# Patient Record
Sex: MALE | Race: WHITE | NOT HISPANIC OR LATINO | Employment: FULL TIME | URBAN - METROPOLITAN AREA
[De-identification: names, ages, dates, MRNs, and addresses within clinical notes are randomized per-mention and may not be internally consistent; named-entity substitution may affect disease eponyms.]

---

## 2018-08-22 LAB
CHOLEST SERPL-MSCNC: 203 MG/DL (ref 0–200)
HDLC SERPL-MCNC: 35 MG/DL
LDL/HDL RATIO: 5.8 (ref 0–5)
LDLC SERPL CALC-MCNC: 148 MG/DL (ref 0–100)
TRIGL SERPL-MCNC: 95 MG/DL (ref 0–150)

## 2019-03-08 RX ORDER — FLUTICASONE PROPIONATE AND SALMETEROL 50; 500 UG/1; UG/1
POWDER RESPIRATORY (INHALATION)
Refills: 0 | OUTPATIENT
Start: 2019-03-08

## 2019-03-26 ENCOUNTER — TELEPHONE (OUTPATIENT)
Dept: PRIMARY CARE CLINIC | Facility: CLINIC | Age: 34
End: 2019-03-26

## 2019-03-26 RX ORDER — FLUTICASONE PROPIONATE AND SALMETEROL 500; 50 UG/1; UG/1
1 POWDER RESPIRATORY (INHALATION) 2 TIMES DAILY
Qty: 60 EACH | Refills: 5 | Status: SHIPPED | OUTPATIENT
Start: 2019-03-26 | End: 2020-03-25

## 2019-03-29 ENCOUNTER — TELEPHONE (OUTPATIENT)
Dept: PRIMARY CARE CLINIC | Facility: CLINIC | Age: 34
End: 2019-03-29

## 2019-06-28 ENCOUNTER — OFFICE VISIT (OUTPATIENT)
Dept: PRIMARY CARE CLINIC | Facility: CLINIC | Age: 34
End: 2019-06-28
Payer: COMMERCIAL

## 2019-06-28 VITALS
HEIGHT: 74 IN | BODY MASS INDEX: 29.65 KG/M2 | SYSTOLIC BLOOD PRESSURE: 122 MMHG | DIASTOLIC BLOOD PRESSURE: 82 MMHG | WEIGHT: 231 LBS | HEART RATE: 80 BPM

## 2019-06-28 DIAGNOSIS — J30.9 ALLERGIC RHINITIS, UNSPECIFIED SEASONALITY, UNSPECIFIED TRIGGER: ICD-10-CM

## 2019-06-28 DIAGNOSIS — R53.83 FATIGUE, UNSPECIFIED TYPE: ICD-10-CM

## 2019-06-28 DIAGNOSIS — J45.20 MILD INTERMITTENT ASTHMA WITHOUT COMPLICATION: ICD-10-CM

## 2019-06-28 DIAGNOSIS — F98.8 ADD (ATTENTION DEFICIT DISORDER) WITHOUT HYPERACTIVITY: ICD-10-CM

## 2019-06-28 DIAGNOSIS — F33.9 RECURRENT MAJOR DEPRESSIVE DISORDER, REMISSION STATUS UNSPECIFIED: Primary | ICD-10-CM

## 2019-06-28 PROCEDURE — 99214 PR OFFICE/OUTPT VISIT, EST, LEVL IV, 30-39 MIN: ICD-10-PCS | Mod: S$GLB,,, | Performed by: NURSE PRACTITIONER

## 2019-06-28 PROCEDURE — 3008F PR BODY MASS INDEX (BMI) DOCUMENTED: ICD-10-PCS | Mod: CPTII,S$GLB,, | Performed by: NURSE PRACTITIONER

## 2019-06-28 PROCEDURE — 99214 OFFICE O/P EST MOD 30 MIN: CPT | Mod: S$GLB,,, | Performed by: NURSE PRACTITIONER

## 2019-06-28 PROCEDURE — 3008F BODY MASS INDEX DOCD: CPT | Mod: CPTII,S$GLB,, | Performed by: NURSE PRACTITIONER

## 2019-06-28 RX ORDER — DESVENLAFAXINE 100 MG/1
100 TABLET, EXTENDED RELEASE ORAL DAILY
COMMUNITY
End: 2019-06-28 | Stop reason: SDUPTHER

## 2019-06-28 RX ORDER — DEXTROAMPHETAMINE SACCHARATE, AMPHETAMINE ASPARTATE MONOHYDRATE, DEXTROAMPHETAMINE SULFATE AND AMPHETAMINE SULFATE 2.5; 2.5; 2.5; 2.5 MG/1; MG/1; MG/1; MG/1
10 CAPSULE, EXTENDED RELEASE ORAL EVERY MORNING
Qty: 30 CAPSULE | Refills: 0 | Status: SHIPPED | OUTPATIENT
Start: 2019-06-28

## 2019-06-28 RX ORDER — DESVENLAFAXINE 100 MG/1
100 TABLET, EXTENDED RELEASE ORAL DAILY
Qty: 30 TABLET | Refills: 11 | Status: SHIPPED | OUTPATIENT
Start: 2019-06-28

## 2019-06-28 RX ORDER — MONTELUKAST SODIUM 10 MG/1
10 TABLET ORAL DAILY PRN
COMMUNITY
End: 2019-09-27 | Stop reason: SDUPTHER

## 2019-06-28 NOTE — PATIENT INSTRUCTIONS
Depression  Depression is one of the most common mental health problems today. It is not just a state of unhappiness or sadness. It is a true disease. The cause seems to be related to a decrease in chemicals that transmit signals in the brain. Having a family history of depression, alcoholism, or suicide increases the risk. Chronic illness, chronic pain, migraine headaches and high emotional stress also increase the risk.  Depression is something we tend to recognize in others, but may have a hard time seeing in ourselves. It can show in many physical and emotional ways:  · Loss of appetite  · Over-eating  · Not being able to sleep  · Sleeping too much  · Tiredness not related to physical exertion  · Restlessness or irritability  · Slowness of movement or speech  · Feeling depressed or withdrawn  · Loss of interest in things you once enjoyed  · Trouble concentrating, poor memory, trouble making decisions  · Thoughts of harming or killing oneself, or thoughts that life is not worth living  · Low self-esteem  The treatment for depression may include both medicine and psychotherapy. Antidepressants can reduce suffering and can improve the ability to function during the depressed period. Therapy can offer emotional support and help you understand emotional factors that may be causing the depression.  Home care  · On-going care and support helps people manage this disease.  Find a healthcare provider and therapist who meet your needs. Seek help when you feel like you may be getting ill.  · Be kind to yourself. Make it a point to do things that you enjoy (gardening, walking in nature, going to a movie, etc.). Reward yourself for small successes.  · Take care of your physical body. Eat a balanced diet (low in saturated fat and high in fruits and vegetables). Exercise at least 3 times a week for 30 minutes. Even mild-moderate exercise (like brisk walking) can make you feel better.  · Avoid alcohol, which can make  depression worse.  · Take medicine as prescribed.  · Tell each of your healthcare providers about all of the prescription drugs, over-the-counter medicines, vitamins, and supplements you take. Certain supplements interact with medicines and can result in dangerous side effects. Ask your pharmacist when you have questions about drug interactions.  · Talk with your family and trusted friends about your feelings and thoughts. Ask them to help you recognize behavior changes early so you can get help and, if needed, medicine can be adjusted.  Follow-up care  Follow up with your healthcare provider, or as advised.  Call 911  Call 911 if you:  · Have suicidal thoughts, a suicide plan, and the means to carry out the plan  · Have trouble breathing  · Are very confused  · Feel very drowsy or have trouble awakening  · Faint or lose consciousness  · Have new chest pain that becomes more severe, lasts longer, or spreads into your shoulder, arm, neck, jaw or back  When to seek medical advice  Call your healthcare provider right away if any of these occur:  · Feeling extreme depression, fear, anxiety, or anger toward yourself or others  · Feeling out of control  · Feeling that you may try to harm yourself or another  · Hearing voices that others do not hear  · Seeing things that others do not see  · Cant sleep or eat for 3 days in a row  · Friends or family express concern over your behavior and ask you to seek help  Date Last Reviewed: 9/29/2015  © 2030-4746 [employer]. [address] [place] [address]. All rights reserved. This information is not intended as a substitute for professional medical care. Always follow your healthcare professional's instructions.          Treating Attention Deficit/Hyperactivity Disorder (ADHD, ADD) in Adults  Attention deficit hyperactivity disorder (ADHD) starts in childhood. It may continue throughout your life. When it does, it may affect your job and even your  relationships. Fortunately, with help, you can manage ADHD.     Treatment for ADD can help you achieve your goals.   Therapy  Your therapist can help you learn healthy ways to cope with ADHD. Sometimes, your partner or family may attend your sessions with you. This helps them understand more about your disorder.  Coaching  An ADHD  works with you to achieve your goals. Youll learn the best ways to manage your time. Youll also learn to avoid clutter and noise that may distract you. In time, your life will have more order and structure. And your  will provide support and feedback on your progress.  Work  Look for jobs where you can be free and creative. Avoid those that are dull or centered on details. You may still need to make a special effort. The following tips may help:  · Try to work at home, at least part-time.  · Ask for a private office.  · Use headphones to muffle noise.  · Work on more than one project at the same time. When you get bored with one, switch to the other.  · Work on boring tasks when you feel most alert.  · Have a schedule for each day.  · Ask your  or  to help with details.  · Use a day planner and to-do lists. Write yourself notes.  · Reward yourself when you finish a task.  Medicines  In some cases, medicines can help control symptoms of ADHD. Your healthcare provider may prescribe a stimulant to help you stay focused. Or you may take a type of antidepressant. It may take some time to find what works best for you. Keep in mind that medicines dont cure ADHD. And they may cause side effects such as headaches, trouble sleeping, or stomach problems. If youre bothered by side effects, be sure to tell your healthcare provider. Changing the dose or type of medicine may help. Most often, youll use medicine along with therapy, coaching, and lifestyle changes.  Date Last Reviewed: 1/1/2017  © 0250-9211 [employer]. [address] [place] [address], [place] [address]. All rights reserved. This information is not intended as a substitute for professional medical care. Always follow your healthcare professional's instructions.

## 2019-06-28 NOTE — PROGRESS NOTES
Subjective:       Patient ID: Robert Brannon is a 34 y.o. male.    Chief Complaint: Follow-up (yearly)    HPI:  Presents for check up. Needs RF on med.  Anxiety/Depression - On Pristiq 100mg, working well, no SI/HI, does have fatigue, tiredness, lack of focus, difficultly concentrating throughout the day, lack of drive. Has tried other anti-depressants, and nothing has worked to stabilize mood as well as Pristiq.   Has Bio T pellets, and those do help with some of the s/e.   Asthma - controlled with inhaler  Allergies - controlled with singulair    Review of Systems   Constitutional: Negative for activity change, chills, diaphoresis and fever.   HENT: Negative for ear pain, mouth sores, nosebleeds and trouble swallowing.    Eyes: Negative for pain and visual disturbance.   Respiratory: Negative for cough, chest tightness, shortness of breath and wheezing.    Cardiovascular: Negative for chest pain, palpitations and leg swelling.   Gastrointestinal: Negative for abdominal distention, abdominal pain and blood in stool.   Endocrine: Negative for polydipsia, polyphagia and polyuria.   Genitourinary: Negative for flank pain and frequency.   Musculoskeletal: Negative for gait problem, joint swelling, neck pain and neck stiffness.   Skin: Negative for color change and pallor.   Neurological: Negative for dizziness, syncope and light-headedness.   Hematological: Negative for adenopathy. Does not bruise/bleed easily.   Psychiatric/Behavioral: Negative for confusion and suicidal ideas.       Objective:      Physical Exam   Constitutional: He is oriented to person, place, and time. He appears well-developed and well-nourished. No distress.   HENT:   Head: Normocephalic and atraumatic.   Mouth/Throat: Mucous membranes are normal. Mucous membranes are not pale, not dry and not cyanotic.   Eyes: Pupils are equal, round, and reactive to light. Conjunctivae and EOM are normal.   Neck: Normal range of motion. Neck supple. No JVD  present. No tracheal deviation present. No thyromegaly present.   Cardiovascular: Normal rate, regular rhythm, normal heart sounds and intact distal pulses.   Pulmonary/Chest: Effort normal and breath sounds normal. No stridor. No respiratory distress. He has no wheezes. He has no rales.   Abdominal: Soft. Bowel sounds are normal. He exhibits no distension. There is no tenderness. There is no guarding.   Musculoskeletal: Normal range of motion. He exhibits no edema.   Lymphadenopathy:     He has no cervical adenopathy.   Neurological: He is alert and oriented to person, place, and time.   Skin: Skin is warm and dry. Capillary refill takes less than 2 seconds. He is not diaphoretic.   Psychiatric: He has a normal mood and affect.   Nursing note and vitals reviewed.      Assessment:       1. Recurrent major depressive disorder, remission status unspecified    2. ADD (attention deficit disorder) without hyperactivity    3. Fatigue, unspecified type    4. Allergic rhinitis, unspecified seasonality, unspecified trigger    5. Mild intermittent asthma without complication    6. BMI 29.0-29.9,adult        Plan:       Recurrent major depressive disorder, remission status unspecified  -     desvenlafaxine succinate (PRISTIQ) 100 MG Tb24; Take 1 tablet (100 mg total) by mouth once daily.  Dispense: 30 tablet; Refill: 11    ADD (attention deficit disorder) without hyperactivity  -     dextroamphetamine-amphetamine (ADDERALL XR) 10 MG 24 hr capsule; Take 1 capsule (10 mg total) by mouth every morning.  Dispense: 30 capsule; Refill: 0    Fatigue, unspecified type  -     dextroamphetamine-amphetamine (ADDERALL XR) 10 MG 24 hr capsule; Take 1 capsule (10 mg total) by mouth every morning.  Dispense: 30 capsule; Refill: 0    Allergic rhinitis, unspecified seasonality, unspecified trigger    Mild intermittent asthma without complication    BMI 29.0-29.9,adult

## 2019-07-29 ENCOUNTER — TELEPHONE (OUTPATIENT)
Dept: PRIMARY CARE CLINIC | Facility: CLINIC | Age: 34
End: 2019-07-29

## 2019-07-31 DIAGNOSIS — E66.9 OBESITY, UNSPECIFIED CLASSIFICATION, UNSPECIFIED OBESITY TYPE, UNSPECIFIED WHETHER SERIOUS COMORBIDITY PRESENT: Primary | ICD-10-CM

## 2019-07-31 RX ORDER — PHENTERMINE HYDROCHLORIDE 37.5 MG/1
37.5 TABLET ORAL
Qty: 30 TABLET | Refills: 0 | Status: SHIPPED | OUTPATIENT
Start: 2019-07-31 | End: 2019-08-30

## 2019-07-31 NOTE — PROGRESS NOTES
Subjective:       Patient ID: Robert Brannon is a 34 y.o. male.    Chief Complaint: No chief complaint on file.    HPI  Review of Systems  Objective:     Physical Exam  Assessment:      1. Obesity, unspecified classification, unspecified obesity type, unspecified whether serious comorbidity present      Plan:     Obesity, unspecified classification, unspecified obesity type, unspecified whether serious comorbidity present  -     phentermine (ADIPEX-P) 37.5 mg tablet; Take 1 tablet (37.5 mg total) by mouth before breakfast.  Dispense: 30 tablet; Refill: 0    Patient does not like the Adderall, did not feel any difference, and is concerned with addictive properties of medication. Has tried Adipex in the past and has helped with appetite suppression and weight loss, also helped improved energy level. Discussed with Dr. Marrufo, will write for 1 month then f/u to evaluate. Risks, benefits, and alternatives discussed with patient, Patient verbalized understanding of discussed plan of care. Asked patient if any further questions, answered no.

## 2019-09-27 DIAGNOSIS — J45.20 MILD INTERMITTENT ASTHMA WITHOUT COMPLICATION: ICD-10-CM

## 2019-09-27 DIAGNOSIS — J30.9 ALLERGIC RHINITIS, UNSPECIFIED SEASONALITY, UNSPECIFIED TRIGGER: ICD-10-CM

## 2019-09-27 RX ORDER — MONTELUKAST SODIUM 10 MG/1
TABLET ORAL
Qty: 90 TABLET | Refills: 3 | Status: SHIPPED | OUTPATIENT
Start: 2019-09-27

## 2020-04-28 ENCOUNTER — PATIENT OUTREACH (OUTPATIENT)
Dept: ADMINISTRATIVE | Facility: HOSPITAL | Age: 35
End: 2020-04-28

## 2020-04-28 NOTE — PROGRESS NOTES
Health Maintenance Updated  Immunizations: Abstracted  Care Everywhere: No new results found  [facility] Search: Pt not found. No results  Health Maintenance Due   Topic    Lipid Panel    Manually entered outside Lipid 8/22/18 results from [facility].

## 2022-07-25 DIAGNOSIS — R10.9 ABDOMINAL PAIN: Primary | ICD-10-CM
